# Patient Record
Sex: FEMALE | Race: WHITE | Employment: UNEMPLOYED | URBAN - NONMETROPOLITAN AREA
[De-identification: names, ages, dates, MRNs, and addresses within clinical notes are randomized per-mention and may not be internally consistent; named-entity substitution may affect disease eponyms.]

---

## 2023-12-05 ENCOUNTER — OFFICE VISIT (OUTPATIENT)
Dept: URGENT CARE | Facility: CLINIC | Age: 38
End: 2023-12-05
Payer: COMMERCIAL

## 2023-12-05 VITALS
HEART RATE: 65 BPM | DIASTOLIC BLOOD PRESSURE: 64 MMHG | TEMPERATURE: 97.7 F | SYSTOLIC BLOOD PRESSURE: 118 MMHG | RESPIRATION RATE: 17 BRPM | OXYGEN SATURATION: 100 %

## 2023-12-05 DIAGNOSIS — R51.9 ACUTE NONINTRACTABLE HEADACHE, UNSPECIFIED HEADACHE TYPE: Primary | ICD-10-CM

## 2023-12-05 PROCEDURE — G0382 LEV 3 HOSP TYPE B ED VISIT: HCPCS

## 2023-12-05 PROCEDURE — 96372 THER/PROPH/DIAG INJ SC/IM: CPT

## 2023-12-05 RX ORDER — KETOROLAC TROMETHAMINE 30 MG/ML
30 INJECTION, SOLUTION INTRAMUSCULAR; INTRAVENOUS ONCE
Status: COMPLETED | OUTPATIENT
Start: 2023-12-05 | End: 2023-12-05

## 2023-12-05 RX ADMIN — KETOROLAC TROMETHAMINE 30 MG: 30 INJECTION, SOLUTION INTRAMUSCULAR; INTRAVENOUS at 13:03

## 2023-12-05 NOTE — PROGRESS NOTES
[facility]        NAME: Freddy Phelps is a 34 y.o. female  : 1985    MRN: [id number]  DATE: 2023  TIME: 1:14 PM    Assessment and Plan   Acute nonintractable headache, unspecified headache type [R51.9]  1. Acute nonintractable headache, unspecified headache type  Ambulatory Referral to Family Practice    ketorolac (TORADOL) injection 30 mg        Right-sided temporal like headache going on for the past 5 days. Has been on/off. Gets relief with Tylenol/ibuprofen but does return shortly after. History of chronic headaches in this area in the past.  States that this headache is very similar compared to prior headaches. Does not have a family doctor. History of allergy to diclofenac in the past, states that she had a rash. However, she is okay with taking ibuprofen at home. Patient given dose of Toradol 30 mg IM in the clinic which she tolerated well -no signs of allergic reaction. Advised to follow-up with family doctor referral.  [place] [facility] to go to the ER if any symptoms worsen. Patient Instructions     Continue with ibuprofen and may try excederin migraine relief. Stay well hydrated and rest.   Follow up with family doctor referral due to history of chronic headaches. If any symptoms worsen such as numbness, tingling, changes in vision, unilateral weakness, difficulty walking-go to the emergency room or call 911 immediately. Follow up with PCP in 3-5 days. Proceed to  ER if symptoms worsen. Chief Complaint     Chief Complaint   Patient presents with    Headache     , Adarsh Ching ([id number])   Right sided headache  Started 5 days ago  OTC tylenol, and ibuprofen   The medication relieves the headache but it comes right back      Back Pain         History of Present Illness       58-year-old male here with significant other for headache that began on the right side of her face/temporal region that began about 5 days ago.   History of headaches in the past-does not have a family doctor and does not take any medications for migraine/headaches. She does take Tylenol and ibuprofen over-the-counter which does help with her symptoms, however headache does return shortly after. PT states that this headache is very similar in comparison to prior headaches. She denies any numbness, tingling, photophobia, phonophobia, lateral weakness, chest pain, shortness of breath, visual changes. Headache  Back Pain  Associated symptoms include headaches. Pertinent negatives include no numbness or weakness. Review of Systems   Review of Systems   Constitutional: Negative. HENT: Negative. Eyes: Negative. Respiratory: Negative. Cardiovascular: Negative. Gastrointestinal: Negative. Musculoskeletal:  Negative for back pain. Skin: Negative. Neurological:  Positive for headaches. Negative for dizziness, seizures, syncope, facial asymmetry, speech difficulty, weakness, light-headedness and numbness. Psychiatric/Behavioral: Negative. Current Medications     No current outpatient medications on file. No current facility-administered medications for this visit. Current Allergies     Allergies as of 12/05/2023 - Reviewed 12/05/2023   Allergen Reaction Noted    Diclofenac Rash 12/02/2019            The following portions of the patient's history were reviewed and updated as appropriate: allergies, current medications, past family history, past medical history, past social history, past surgical history and problem list.     Past Medical History:   Diagnosis Date    GERD (gastroesophageal reflux disease)        History reviewed. No pertinent surgical history. No family history on file. Medications have been verified. Objective   /64   Pulse 65   Temp 97.7 °F (36.5 °C)   Resp 17   SpO2 100%        Physical Exam     Physical Exam  Constitutional:       General: She is awake. She is not in acute distress.      Appearance: Normal appearance. She is not ill-appearing, toxic-appearing or diaphoretic. HENT:      Head: Normocephalic and atraumatic. Eyes:      General: No visual field deficit. Extraocular Movements: Extraocular movements intact. Conjunctiva/sclera: Conjunctivae normal.      Pupils: Pupils are equal, round, and reactive to light. Cardiovascular:      Rate and Rhythm: Normal rate and regular rhythm. Pulses: Normal pulses. Heart sounds: Normal heart sounds. No murmur heard. Pulmonary:      Effort: Pulmonary effort is normal. No respiratory distress. Breath sounds: Normal breath sounds. No wheezing, rhonchi or rales. Chest:      Chest wall: No tenderness. Musculoskeletal:      Cervical back: Normal range of motion and neck supple. No rigidity or tenderness. Skin:     General: Skin is warm and dry. Capillary Refill: Capillary refill takes less than 2 seconds. Findings: No rash. Neurological:      General: No focal deficit present. Mental Status: She is alert, oriented to person, place, and time and easily aroused. Mental status is at baseline. GCS: GCS eye subscore is 4. GCS verbal subscore is 5. GCS motor subscore is 6. Cranial Nerves: Cranial nerves 2-12 are intact. No cranial nerve deficit, dysarthria or facial asymmetry. Sensory: Sensation is intact. No sensory deficit. Motor: Motor function is intact. No weakness, abnormal muscle tone or seizure activity. Coordination: Coordination is intact. Coordination normal. Finger-Nose-Finger Test and Heel to [facility] Test normal.      Gait: Gait is intact. Gait normal.      Deep Tendon Reflexes: Reflexes are normal and symmetric. Reflexes normal.   Psychiatric:         Mood and Affect: Mood normal.         Behavior: Behavior normal. Behavior is cooperative.

## 2023-12-05 NOTE — PATIENT INSTRUCTIONS
Continue with ibuprofen and may try excederin migraine relief. Stay well hydrated and rest.   Follow up with family doctor referral due to history of chronic headaches. If any symptoms worsen such as numbness, tingling, changes in vision, unilateral weakness, difficulty walking-go to the emergency room or call 911 immediately. Follow up with PCP in 3-5 days. Proceed to  ER if symptoms worsen. Dolor de cyril patsy   LO QUE NECESITA SABER:   El dolor de [place] patsy es un dolor o anitha molestia que puede empezar de repente y empeorar rápidamente. Usted puede tener un dolor de cyril patsy sólo cuando siente estrés o come ciertos alimentos. Otro tipo dolor de cyril patsy puede producirse todos los días y a veces varias veces al día. INSTRUCCIONES SOBRE EL [facility]ITALARIA:   Regrese a la carl de emergencias si:  Usted tiene dolor intenso. Usted tiene entumecimiento en un lado de gorman mihai o cuerpo. Usted tiene un dolor de cyril que ocurre después de un golpe en la cyril, anitha caída u otro trauma. Tiene dolor de [place], está olvidadizo o confundido o tiene dificultad para hablar. Tiene dolor de [place], rigidez en el sunny y [place]. Llame a gorman médico si:  Usted tiene un dolor de cyril stacy y está vomitando. Usted tiene dolor de cyril todos los días y no se [place] aun después de tratarlo. Debbie miranda de [place] u ocurren nuevos síntomas cuando tiene dolor de [place]. Usted tiene preguntas o inquietudes acerca de gorman condición o cuidado. Medicamentos: Es posible que usted necesite alguno de los siguientes:  Los analgésicos recetados podrían administrarse. El medicamento que recomienda gorman médico dependerá del tipo de dolor de cyril que tenga. Usted necesitará zion medicamentos para el dolor de cyril según las indicaciones para evitar un problema llamado dolor de cyril de rebote.  Estos miranda de [place] ocurren con el uso regular de analgésicos para los trastornos de dolor de cyril. KARLA yamilex el ibuprofeno, ayudan a disminuir la inflamación, el dolor y la [place]. Ava medicamento está disponible con o sin anitha receta médica. Los KARLA pueden causar sangrado estomacal o problemas renales en ciertas personas. Si usted araceli un medicamento anticoagulante, siempre pregúntele a gorman médico si los KARLA son seguros para usted. Siempre allegra la etiqueta de ava medicamento y [address] instrucciones. Acetaminofén [employer] dolor y baja la fiebre. Está disponible sin receta médica. Pregunte la cantidad y la frecuencia con que debe tomarlos. [address] Allegra las etiquetas de todos los demás medicamentos que esté usando para saber si también contienen acetaminofén, o pregunte a gorman médico o farmacéutico. El acetaminofén puede causar daño en el hígado cuando no se araceli de forma correcta. Antidepresivos se pueden administrar para algunos tipos de miranda de [place]. Sargent kristy medicamentos yamilex se le haya indicado. Consulte con gorman médico si usted enoch que gorman medicamento no le está ayudando o si presenta efectos secundarios. Infórmele al médico si usted es alérgico a algún medicamento. Mantenga anitha lista actualizada de los [place], las vitaminas y los productos herbales que araecli. Incluya los siguientes datos de los medicamentos: cantidad, frecuencia y motivo de administración. Traiga con usted la lista o los envases de las píldoras a kristy citas de seguimiento. Lleve la lista de los medicamentos con usted en vanessa de anitha emergencia. El manejo de kristy síntomas:  Aplique hielo o calor en la merissa donde gorman hijo siente el dolor de cyril. Utilice un paquete (compresa) de hielo o calor. Para un paquete de hielo, también puede colocar hielo molido en anitha bolsa plástica. Cubra el paquete de hielo o la bolsa con anitha toalla pequeña antes de aplicarla en la piel. Tanto el hielo yamilex el calor ayudan a reducir el dolor, y el calor también contribuye a reducir los CRICO [place].  Aplique calor martha 20 a 30 minutos cada 2 horas. Aplique hielo martha 15 a 20 minutos cada hora. Aplique calor o hielo martha el tiempo y la cantidad de días que se le indique. Usted puede alternar el calor y el hielo. Relaje kristy músculos. Acuéstese en anitha posición cómoda y cierre kristy ojos. Relaje kristy músculos lentamente. Comience por los dedos de los pies y avance hacia arriba al kayla de gorman cuerpo. Registre en un diario kristy miranda de [place]. Escriba cuándo comienzan y terminan kristy migrañas. Merlinleatha Ennisi y qué estaba haciendo cuando comenzó la migraña. Registre lo que comió y lo que tomó las 24 horas previas al comienzo de gorman migraña. Charlesetta Purdue dolor y dónde le duele: Lleve un registro de lo que hizo para tratar gorman [place] y si obtuvo un resultado satisfactorio. Cómo prevenir un dolor de cyril patsy:  Evite cualquier cosa que provoque un dolor de [place] patsy. Los ejemplos incluyen la exposición a sustancias químicas, las grandes altitudes o no dormir lo suficiente. Kirstin anitha rutina para dormir. Acuéstese y [employer] días a la misma hora. No utilice aparatos electrónicos antes de acostarse. Pueden provocarle un dolor de cyril o impedirle dormir christa. No fume. La nicotina y otras sustancias químicas en los cigarrillos y puros pueden desencadenar un dolor de cyril patsy o [place]. Pida información a gorman médico si usted actualmente fuma y necesita ayuda para dejar de fumar. Los cigarrillos electrónicos o el tabaco sin humo igualmente contienen nicotina. Consulte con gorman médico antes de [employer]. Limite el consumo de alcohol según le indicaron. El alcohol puede provocar un dolor de cyril patsy o empeorarlo. Si usted tiene miranda de [place] de racimo, no mirian alcohol martha un episodio. Para otros tipos de miranda de [place], pregúntele a gorman proveedor de atención médica si es seguro para usted beber alcohol.  Pregunte cuál es la cantidad ray que puede beber y con [place] frecuencia. Ejercítese según indicaciones. El ejercicio puede reducir la tensión y [place] a aliviar el dolor de [place]. Propóngase hacer 30 minutos de [place] física nikita todos los días de la [place]. Gorman médico puede ayudarle a crear un plan de ejercicios. Consuma alimentos saludables y variados. [address] frutas, verduras, productos lácteos bajos en grasa, brandon [place], pescado y frijoles cocidos. Gorman médico o dietista puede ayudarle a crear planes de comidas si desea evitar los alimentos que provocan miranda de [place]. Acuda a kristy consultas de control con gorman médico según le indicaron: Traiga gorman registro de miranda de cyril con usted cuando visite a gorman médico. Anote kristy preguntas para que se acuerde de hacerlas martha kristy visitas. © Copyright [facility] 2023 Information is for End User's use only and may not be sold, redistributed or otherwise used for commercial purposes. Esta información es sólo para uso en educación. Gorman intención no es darle un consejo médico sobre enfermedades o tratamientos. Colsulte con gorman Georganne Jester farmacéutico antes de seguir cualquier régimen médico para saber si es seguro y efectivo para usted.

## 2024-01-16 ENCOUNTER — TELEPHONE (OUTPATIENT)
Dept: FAMILY MEDICINE CLINIC | Facility: CLINIC | Age: 39
End: 2024-01-16